# Patient Record
Sex: MALE | Race: WHITE | NOT HISPANIC OR LATINO | URBAN - METROPOLITAN AREA
[De-identification: names, ages, dates, MRNs, and addresses within clinical notes are randomized per-mention and may not be internally consistent; named-entity substitution may affect disease eponyms.]

---

## 2023-08-04 ENCOUNTER — OFFICE VISIT (OUTPATIENT)
Dept: NEUROSURGERY | Facility: CLINIC | Age: 58
End: 2023-08-04
Payer: COMMERCIAL

## 2023-08-04 VITALS
WEIGHT: 261.4 LBS | TEMPERATURE: 97.3 F | BODY MASS INDEX: 34.64 KG/M2 | DIASTOLIC BLOOD PRESSURE: 96 MMHG | SYSTOLIC BLOOD PRESSURE: 150 MMHG | HEIGHT: 73 IN

## 2023-08-04 DIAGNOSIS — M50.30 DEGENERATIVE DISC DISEASE, CERVICAL: ICD-10-CM

## 2023-08-04 DIAGNOSIS — M47.812 CERVICAL SPONDYLOSIS WITHOUT MYELOPATHY: ICD-10-CM

## 2023-08-04 DIAGNOSIS — M54.12 CERVICAL RADICULOPATHY: Primary | ICD-10-CM

## 2023-08-04 DIAGNOSIS — Z00.6 EXAMINATION FOR NORMAL COMPARISON OR CONTROL IN CLINICAL RESEARCH: ICD-10-CM

## 2023-08-04 RX ORDER — ATORVASTATIN CALCIUM 20 MG/1
1 TABLET, FILM COATED ORAL DAILY
COMMUNITY
Start: 2020-07-31

## 2023-08-04 RX ORDER — SILDENAFIL CITRATE 20 MG/1
TABLET ORAL
COMMUNITY
End: 2023-08-04

## 2023-08-04 RX ORDER — DICYCLOMINE HYDROCHLORIDE 10 MG/1
CAPSULE ORAL
COMMUNITY
End: 2023-08-04

## 2023-08-04 RX ORDER — LISINOPRIL 40 MG/1
1 TABLET ORAL DAILY
COMMUNITY
Start: 2000-05-31

## 2023-08-04 RX ORDER — FLUTICASONE PROPIONATE 50 MCG
2 SPRAY, SUSPENSION (ML) NASAL DAILY
COMMUNITY
End: 2023-08-04

## 2023-08-17 ENCOUNTER — TELEPHONE (OUTPATIENT)
Dept: NEUROSURGERY | Facility: CLINIC | Age: 58
End: 2023-08-17
Payer: COMMERCIAL

## 2023-08-17 DIAGNOSIS — M54.12 CERVICAL RADICULOPATHY: Primary | ICD-10-CM

## 2023-08-17 DIAGNOSIS — M50.30 DEGENERATIVE DISC DISEASE, CERVICAL: ICD-10-CM

## 2023-08-17 RX ORDER — DIAZEPAM 5 MG/1
5 TABLET ORAL AS NEEDED
Qty: 2 TABLET | Refills: 0 | Status: SHIPPED | OUTPATIENT
Start: 2023-08-17

## 2023-08-17 NOTE — TELEPHONE ENCOUNTER
Caller: Benedict Landaverde    Relationship to patient: Self    Best call back number: [phone]    Patient is needing: PATIENT CALLED, PATIENT IS WANTING TO KNOW IF HE CAN BE SEDATED TO COMPLETE AN MRI. PATIENT WAS UNABLE TO COMPLETE HIS MRI AT [facility] DUE TO CLAUSTROPHOBIA. PLEASE CALL PATIENT TO ADVISE.    THANK YOU

## 2023-08-17 NOTE — TELEPHONE ENCOUNTER
Provider:  Porsche  Surgery/Procedure:  MICHAEL  Surgery/Procedure Date:    Last visit:   8/4/23  Next visit: 8/25/23     Reason for call:  Mr. Landaverde reports being unable to complete his MRI at [facility]. He preferred that location as he thought they had an open MRI machine, but on arrival was told they only had the normal MRI available. Due to his claustrophobia he was unable to complete the imaging. He is requesting something to help him complete the imaging, and to reschedule the MRI.     Romario: nothing to report on Romario.

## 2023-08-17 NOTE — TELEPHONE ENCOUNTER
Can you please pend this over to Katie, Burak, or Jason so either of them can sign this for me? I am still unable to sign this drug class.

## 2023-08-17 NOTE — TELEPHONE ENCOUNTER
I have already talked with the patient, and he will r/s his MRI and if needed reach back out to the office to r/s follow up.

## 2023-09-12 ENCOUNTER — HOSPITAL ENCOUNTER (OUTPATIENT)
Dept: GENERAL RADIOLOGY | Facility: HOSPITAL | Age: 58
Discharge: HOME | End: 2023-09-12
Payer: COMMERCIAL

## 2023-09-12 ENCOUNTER — APPOINTMENT (OUTPATIENT)
Dept: OTHER | Facility: HOSPITAL | Age: 58
End: 2023-09-12
Payer: COMMERCIAL

## 2023-09-12 ENCOUNTER — OFFICE VISIT (OUTPATIENT)
Dept: NEUROSURGERY | Facility: CLINIC | Age: 58
End: 2023-09-12
Payer: COMMERCIAL

## 2023-09-12 VITALS — BODY MASS INDEX: 34.59 KG/M2 | HEIGHT: 73 IN | WEIGHT: 261 LBS

## 2023-09-12 DIAGNOSIS — M54.12 CERVICAL RADICULOPATHY: ICD-10-CM

## 2023-09-12 DIAGNOSIS — M47.812 CERVICAL SPONDYLOSIS WITHOUT MYELOPATHY: ICD-10-CM

## 2023-09-12 DIAGNOSIS — M50.30 DEGENERATIVE DISC DISEASE, CERVICAL: ICD-10-CM

## 2023-09-12 DIAGNOSIS — M54.12 CERVICAL RADICULOPATHY: Primary | ICD-10-CM

## 2023-09-12 PROCEDURE — 72050 X-RAY EXAM NECK SPINE 4/5VWS: CPT

## 2023-09-12 PROCEDURE — 99214 OFFICE O/P EST MOD 30 MIN: CPT

## 2023-09-12 NOTE — PROGRESS NOTES
Office Note     Name: Benedict Landaverde    : 1965     MRN: [id number]     PCP: Chuck Leyva MD    Chief Complaint  Neck and left upper extremity pain with sensory alteration    Subjective     History of Present Illness:  Mr. 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  He has previously underwent ACDF C6-7 by Dr. Montalvo in .  More recently, he has developed progressively worsening neck and left upper extremity pain with sensory alteration.  He has dealt with neck pain for the past few years.  Much of his pain resides along the neck and into his shoulder blade region.  Roughly a month or so ago he developed numbness of the left upper extremity extending from the neck down to the elbow radiating into the thumb and index finger.  His symptoms are typically worse in the afternoons.  Driving and working on a computer exacerbates his symptoms.  There is no trauma or inciting event.  He denies right upper extremity symptoms.  He utilizes a TENS unit and stretching exercises at home to give him relief.  He also reports a loss in strength in the left hand.  He denies bladder dysfunctions, weakness, or gait dysfunction.  No other complaints this time.    Review of Systems:   Review of Systems    The patient's past medical history, past surgical history, family history, and social history have been reviewed at length in the electronic medical record.       Past Medical History:   Past Medical History:   Diagnosis Date    Arthritis     Unduagnosed but having snehal joint pain in hands    Cervical disc disorder 2005    Hyperlipidemia 2020    Hypertension 2000    Low back pain 1980    Lumbosacral disc disease        Past Surgical History:   Past Surgical History:   Procedure Laterality Date    NECK SURGERY  2006       Family History:   Family History   Problem Relation Age of Onset    Hyperlipidemia Mother      "Arthritis Father     Cancer Father        Social History:   Social History     Socioeconomic History    Marital status:    Tobacco Use    Smoking status: Never    Tobacco comments:     I use smokless tobacco refularly   Vaping Use    Vaping Use: Never used   Substance and Sexual Activity    Alcohol use: Yes     Alcohol/week: 10.0 standard drinks     Types: 10 Shots of liquor per week    Drug use: Never    Sexual activity: Yes     Partners: Female     Birth control/protection: Surgical       Immunizations:   Immunization History   Administered Date(s) Administered    COVID-19 (PFIZER) Purple Cap Monovalent 03/19/2021, 04/10/2021, 11/22/2021        Medications:     Current Outpatient Medications:     atorvastatin (LIPITOR) 20 MG tablet, Take 1 tablet by mouth Daily., Disp: , Rfl:     lisinopril (PRINIVIL,ZESTRIL) 40 MG tablet, Take 1 tablet by mouth Daily., Disp: , Rfl:     diazePAM (VALIUM) 5 MG tablet, Take 1 tablet by mouth As Needed for Anxiety. Take 1 tablet 30 minutes prior to your MRI, take 1 additional tablet if needed., Disp: 2 tablet, Rfl: 0    Allergies:   No Known Allergies    Objective     Vital Signs  Ht 185.4 cm (73\")   Wt 118 kg (261 lb)   BMI 34.43 kg/m²   Estimated body mass index is 34.43 kg/m² as calculated from the following:    Height as of this encounter: 185.4 cm (73\").    Weight as of this encounter: 118 kg (261 lb).    Physical Exam   Constitutional: Patient is well-developed and appears stated age. Pleasant and   cooperative. Appears in no acute distress.   HENT:   Head normocephalic and atraumatic.  Eyes: Pupils are equal, round, and reactive to light.   Musculoskeletal:     Strength is intact in upper and lower extremities to direct testing.     Station and gait are normal.     Straight leg raising is negative.   Neurologic:     Muscle tone is normal throughout.     Coordination is intact.     No clonus is elicited.      Deep tendon reflexes: 1+ and symmetrical.     Sensation is " intact to light touch throughout.     Patient is oriented to person, place, and time.     Regan sign negative bilaterally  Psychiatric: Normal behavior and thought content.  Skin: Clean, dry, and intact.     Tobacco Use: Unknown    Smoking Tobacco Use: Never    Smokeless Tobacco Use: Unknown    Passive Exposure: Not on file        Body mass index is 34.43 kg/m².    Fall Risk Assessment was completed, and patient is at low risk for falls.    Assessment and Plan     Medical Decision Making     Data Review:   (All imaging independently reviewed unless stated otherwise.)   CT of the cervical spine without contrast dated 11/15/2022 demonstrates degenerative changes throughout the cervical spine with multiple disc osteophyte complexes.  There is facet hypertrophy throughout but most severe at C 5-6.  Moderate to severe left and right neuroforaminal stenosis at multiple levels.  Previous fusion at C6-7 is noted with no hardware complications.     MRI of the cervical spine with and without contrast dated 8/31/2023 demonstrates degenerative changes and postsurgical changes from previous fusion at C6/7.  There are multiple disc osteophyte complexes most severe at C3-4 and C5-6 with moderate to severe left and moderate right foraminal narrowing.    Plain films of the cervical spine complete with flexion/extension dated 9/12/2023 demonstrate    Diagnosis:  1.  Cervical radiculopathy  2.  Status post ACDF C6-7  3.  Degenerative disc disease, cervical    Treatment Options:   Mr. Landaverde presents today to discuss recent imaging.  Symptoms remain unchanged from his previous visit.  Long tract signs remain absent on exam.  At this time, I have encouraged him to attend formal physical therapy.  We discussed multiple options moving forward.  Surgical intervention may be a possibility.  He will follow-up in 4 to 6 weeks to check on his progress.  If he develops new or worsening symptoms I have encouraged him to call our office he will  be scheduled immediately for follow-up.       Diagnosis Plan   1. Cervical radiculopathy        2. Degenerative disc disease, cervical        3. Cervical spondylosis without myelopathy                  Madi Morrell PA-C  [facility]  [address] [address] [phone]

## 2023-10-10 ENCOUNTER — OFFICE VISIT (OUTPATIENT)
Dept: NEUROSURGERY | Facility: CLINIC | Age: 58
End: 2023-10-10
Payer: COMMERCIAL

## 2023-10-10 VITALS
SYSTOLIC BLOOD PRESSURE: 158 MMHG | DIASTOLIC BLOOD PRESSURE: 100 MMHG | BODY MASS INDEX: 34.85 KG/M2 | WEIGHT: 263 LBS | HEIGHT: 73 IN

## 2023-10-10 DIAGNOSIS — M50.30 DEGENERATIVE DISC DISEASE, CERVICAL: ICD-10-CM

## 2023-10-10 DIAGNOSIS — M47.812 CERVICAL SPONDYLOSIS WITHOUT MYELOPATHY: ICD-10-CM

## 2023-10-10 DIAGNOSIS — M54.12 CERVICAL RADICULOPATHY: Primary | ICD-10-CM

## 2023-10-10 NOTE — PROGRESS NOTES
Office Note     Name: Benedcit Landaverde    : 1965     MRN: [id number]     PCP: Chuck Leyva MD    Chief Complaint  Neck and left upper extremity pain with sensory alteration.    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  He has previously underwent ACDF C6-7 by Dr. Montalvo in .  More recently, he has developed progressively worsening neck and left upper extremity pain with sensory alteration.  He has dealt with neck pain for the past few years.  Much of his pain resides along the neck and into his shoulder blade region.  A few months ago  he developed numbness of the left upper extremity extending from the neck down to the elbow radiating into the thumb and index finger.  He reports that his first 3 digits of the left hand are completely numb.  His symptoms are typically worse in the afternoons.  Driving and working on a computer exacerbates his symptoms.  There was no trauma or inciting event.  More recently he has been developing some tolerable right upper extremity symptoms as well.  He utilizes a TENS unit and stretching exercises at home to give him relief.  He also reports a loss in strength in the left hand.  He does report that his symptoms are tolerable at times.  He denies bladder dysfunctions, weakness, or gait dysfunction.  No other complaints this time.    Review of Systems:   Review of Systems   Musculoskeletal:  Positive for back pain, neck pain and neck stiffness.   Neurological:  Positive for numbness (left arm).     The patient's past medical history, past surgical history, family history, and social history have been reviewed at length in the electronic medical record.       Past Medical History:   Past Medical History:   Diagnosis Date    Abnormal ECG     Arthritis     Unduagnosed but having snehal joint pain in hands    Cervical disc  "disorder 2005    Claustrophobia     Hyperlipidemia 2020    Hypertension 2000    Low back pain 1980    Lumbosacral disc disease        Past Surgical History:   Past Surgical History:   Procedure Laterality Date    NECK SURGERY  2006    SPINAL FUSION  2007       Family History:   Family History   Problem Relation Age of Onset    Hyperlipidemia Mother     Arthritis Father     Cancer Father     COPD Father        Social History:   Social History     Socioeconomic History    Marital status:    Tobacco Use    Smoking status: Never    Smokeless tobacco: Current     Types: Snuff    Tobacco comments:     I use smokless tobacco refularly   Vaping Use    Vaping Use: Never used   Substance and Sexual Activity    Alcohol use: Yes     Alcohol/week: 10.0 standard drinks of alcohol     Types: 10 Shots of liquor per week    Drug use: Never    Sexual activity: Yes     Partners: Female     Birth control/protection: Surgical       Immunizations:   Immunization History   Administered Date(s) Administered    COVID-19 (PFIZER) Purple Cap Monovalent 03/19/2021, 04/10/2021, 11/22/2021        Medications:     Current Outpatient Medications:     atorvastatin (LIPITOR) 20 MG tablet, Take 1 tablet by mouth Daily., Disp: , Rfl:     lisinopril (PRINIVIL,ZESTRIL) 40 MG tablet, Take 1 tablet by mouth Daily., Disp: , Rfl:     Allergies:   No Known Allergies    Objective     Vital Signs  /100 (BP Location: Right arm, Patient Position: Sitting, Cuff Size: Adult)   Ht 185.4 cm (73\")   Wt 119 kg (263 lb)   BMI 34.70 kg/mý   Estimated body mass index is 34.7 kg/mý as calculated from the following:    Height as of this encounter: 185.4 cm (73\").    Weight as of this encounter: 119 kg (263 lb).    Physical Exam   Constitutional: Patient is well-developed and appears stated age. Pleasant and   cooperative. Appears in no acute distress.   HENT:   Head normocephalic and atraumatic.  Eyes: Pupils are equal, round, and reactive to light. "   Musculoskeletal:     Strength is intact in upper and lower extremities to direct testing.     Station and gait are normal.     Range of motion of the neck is slightly limited.     Tenderness to palpation is not observed in the cervical spine.  Neurologic:     Muscle tone is normal throughout.     Coordination is intact.     No clonus is elicited.      Deep tendon reflexes: 1+ and symmetrical.     Sensation is a bit altered to light touch testing along the medial forearm and into the first 3 digits of the left hand.     Patient is oriented to person, place, and time.     Regan sign negative bilaterally  Psychiatric: Normal behavior and thought content.  Skin: Clean, dry, and intact.     Tobacco Use: High Risk (10/10/2023)    Patient History     Smoking Tobacco Use: Never     Smokeless Tobacco Use: Current     Passive Exposure: Not on file        Body mass index is 34.7 kg/mý.    Fall Risk Assessment was completed, and patient is at low risk for falls.    Assessment and Plan     Medical Decision Making     Data Review:   (All imaging independently reviewed unless stated otherwise.)   CT of the cervical spine without contrast dated 11/15/2022 demonstrates degenerative changes throughout the cervical spine with multiple disc osteophyte complexes.  There is facet hypertrophy throughout but most severe at C 5-6.  Moderate to severe left and right neuroforaminal stenosis at multiple levels.  Previous fusion at C6-7 is noted with no hardware complications.      MRI of the cervical spine with and without contrast dated 8/31/2023 demonstrates degenerative changes and postsurgical changes from previous fusion at C6/7.  There are multiple disc osteophyte complexes most severe at C3-4 and C5-6 with moderate to severe left and moderate right foraminal narrowing.     Plain films of the cervical spine complete with flexion/extension dated 9/12/2023 demonstrate    Plain films of the cervical spine complete with flexion and  extension demonstrate postsurgical and degenerative changes.  There is slight spurring at C3-4 with a slight retrolisthesis.  Mild spurring at C4-5 and C5-6.  Normal alignment at the operative site.    Diagnosis:  1.  Cervical radiculopathy  2.  Status post ACDF C6-7  3.  Degenerative disc disease, cervical    Treatment Options:   Mr. Landaverde presents today for follow-up.  He has utilized a few sessions of physical therapy but has not seen much relief.  He does have plans to continue this.  He is not particularly keen about starting gabapentin.  He has also utilized epidural steroid injections in the past that provided no relief.  At this time, he is dealing with some current family issues that hinders him from moving forward with other options.  In the future, surgical intervention may be a possibility.  He has requested to follow-up in 6 weeks to give us an update on his progress.  If he develops new or worsening symptoms I encouraged him to call our office immediately.       Diagnosis Plan   1. Cervical radiculopathy        2. Degenerative disc disease, cervical        3. Cervical spondylosis without myelopathy                Madi Morrell PA-C  [facility]  [address] [address] [phone]

## 2023-11-22 ENCOUNTER — OFFICE VISIT (OUTPATIENT)
Dept: NEUROSURGERY | Facility: CLINIC | Age: 58
End: 2023-11-22
Payer: COMMERCIAL

## 2023-11-22 VITALS — HEIGHT: 73 IN | TEMPERATURE: 97.5 F | BODY MASS INDEX: 34.72 KG/M2 | WEIGHT: 262 LBS

## 2023-11-22 DIAGNOSIS — Z98.1 HISTORY OF FUSION OF CERVICAL SPINE: ICD-10-CM

## 2023-11-22 DIAGNOSIS — M54.12 CERVICAL RADICULOPATHY: Primary | ICD-10-CM

## 2023-11-22 DIAGNOSIS — M50.30 DEGENERATIVE DISC DISEASE, CERVICAL: ICD-10-CM

## 2023-11-22 PROCEDURE — 99213 OFFICE O/P EST LOW 20 MIN: CPT | Performed by: NEUROLOGICAL SURGERY

## 2023-11-22 NOTE — PROGRESS NOTES
Patient: Benedict Landaverde  : 1965    Primary Care Provider: Chuck Leyva MD    Requesting Provider: As above        History    Chief Complaint: Neck and left upper extremity pain with sensory alteration.    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  In  underwent ACDF at C6-7 by my former colleague Dr. Montalvo.  He has had some low-grade symptoms since that time.  More recently developed neck and left arm pain with sensory alteration involving the left thumb.  The arm pain has gone away.  He still has some numbness in the left thumb.  At times he will get numbness in all of the digits of the left hand except the fifth digit.  He reports no focal weakness at this time.  His symptoms are annoying but not debilitating.      Review of Systems   Constitutional:  Negative for activity change, appetite change, chills, diaphoresis, fatigue, fever and unexpected weight change.   HENT:  Negative for congestion, dental problem, drooling, ear discharge, ear pain, facial swelling, hearing loss, mouth sores, nosebleeds, postnasal drip, rhinorrhea, sinus pressure, sinus pain, sneezing, sore throat, tinnitus, trouble swallowing and voice change.    Eyes:  Negative for photophobia, pain, discharge, redness, itching and visual disturbance.   Respiratory:  Negative for apnea, cough, choking, chest tightness, shortness of breath, wheezing and stridor.    Cardiovascular:  Negative for chest pain, palpitations and leg swelling.   Gastrointestinal:  Negative for abdominal distention, abdominal pain, anal bleeding, blood in stool, constipation, diarrhea, nausea, rectal pain and vomiting.   Endocrine: Negative for cold intolerance, heat intolerance, polydipsia, polyphagia and polyuria.   Genitourinary:  Negative for decreased urine volume, difficulty urinating, dysuria, enuresis, flank pain, frequency, genital sores, hematuria, penile discharge, penile pain, penile swelling, scrotal  "swelling, testicular pain and urgency.   Musculoskeletal:  Positive for neck stiffness. Negative for arthralgias, back pain, gait problem, joint swelling, myalgias and neck pain.   Skin:  Negative for color change, pallor, rash and wound.   Allergic/Immunologic: Negative for environmental allergies, food allergies and immunocompromised state.   Neurological:  Positive for numbness. Negative for dizziness, tremors, seizures, syncope, facial asymmetry, speech difficulty, weakness, light-headedness and headaches.   Hematological:  Negative for adenopathy. Does not bruise/bleed easily.   Psychiatric/Behavioral:  Negative for agitation, behavioral problems, confusion, decreased concentration, dysphoric mood, hallucinations, self-injury, sleep disturbance and suicidal ideas. The patient is not nervous/anxious and is not hyperactive.        The patient's past medical history, past surgical history, family history, and social history have been reviewed at length in the electronic medical record.      Physical Exam:   Temp 97.5 °F (36.4 °C) (Infrared)   Ht 185.4 cm (73\")   Wt 119 kg (262 lb)   BMI 34.57 kg/m²   MUSCULOSKELETAL:  Neck tenderness to palpation is not observed.   ROM in the neck is normal.  Tinel's sign is negative at both wrist.  NEUROLOGICAL:  Strength is intact in the upper and lower extremities to direct testing.  Muscle tone is normal throughout.  Station and gait are normal.  Sensation is intact to light touch testing throughout except in the left thumb where it is diminished.  Deep tendon reflexes are 1+ and symmetrical.  Regan's Sign is negative bilaterally.       Medical Decision Making    Data Review:   (All imaging studies were personally reviewed unless stated otherwise)  MRI of the cervical spine dated 8/31/2023 demonstrates central disc-osteophyte at C3-4 of the causes mild contouring of the spinal cord.  Solid fusion is noted at C6-7.  Broad-based spurring narrows the recesses bilaterally at " C5-6.  There is some foraminal narrowing on the left at C7-T1.    Diagnosis:   1.  Cervical spondylosis with left upper extremity radiculopathy, improved.  2.  Possible peripheral nerve entrapment.    Treatment Options:   The patient symptoms are modest at this point.  I do not see anything worrisome on his studies.  If his symptoms progress and become less tolerable then he will follow-up with me.  His thumb numbness may or may not improve over time.      Scribed for Daniele Carlson MD by Daniele Carlson MD on 11/22/2023 14:47 EST      I, Dr. Carlson, personally performed the services described in the documentation, as scribed in my presence, and it is both accurate and complete.

## 2024-01-04 ENCOUNTER — OFFICE VISIT (OUTPATIENT)
Dept: CARDIOLOGY | Facility: CLINIC | Age: 59
End: 2024-01-04
Payer: COMMERCIAL

## 2024-01-04 VITALS
HEIGHT: 73 IN | OXYGEN SATURATION: 96 % | SYSTOLIC BLOOD PRESSURE: 140 MMHG | HEART RATE: 78 BPM | WEIGHT: 263 LBS | BODY MASS INDEX: 34.85 KG/M2 | DIASTOLIC BLOOD PRESSURE: 84 MMHG

## 2024-01-04 DIAGNOSIS — G47.33 OSA (OBSTRUCTIVE SLEEP APNEA): Primary | ICD-10-CM

## 2024-01-04 DIAGNOSIS — I10 PRIMARY HYPERTENSION: ICD-10-CM

## 2024-01-04 DIAGNOSIS — E78.00 PURE HYPERCHOLESTEROLEMIA: ICD-10-CM

## 2024-01-04 DIAGNOSIS — Z12.5 SCREENING FOR PROSTATE CANCER: ICD-10-CM

## 2024-01-04 RX ORDER — AMLODIPINE BESYLATE 5 MG/1
5 TABLET ORAL DAILY
Qty: 90 TABLET | Refills: 0 | Status: SHIPPED | OUTPATIENT
Start: 2024-01-04

## 2024-01-04 NOTE — ASSESSMENT & PLAN NOTE
Baseline AHI 27.  This is moderate sleep apnea.    He is on CPAP therapy.  Download reviewed with good control and good compliance.    He is benefiting from PAP therapy with plan to continue PAP therapy.    Noted that his original PAP device is nearing 5 years old.  At follow-up visit consider and discuss new CPAP.

## 2024-01-04 NOTE — ASSESSMENT & PLAN NOTE
He is on atorvastatin 20 mg.    No recent fasting lipids.    Plan:  Continue atorvastatin 20mg   Check fasting lipid profile

## 2024-01-04 NOTE — ASSESSMENT & PLAN NOTE
Patient's blood pressure 140/84.    Noted to be elevated at office visit 10/10/2023.    Patient reports blood pressure has been elevated on and off at home and at doctor's office visits.    Noted suboptimal control of blood pressure.    He is on lisinopril 40 mg daily.    We will add amlodipine 5 mg daily.    Short follow-up in 1 month to recheck blood pressure.

## 2024-01-04 NOTE — PROGRESS NOTES
[facility] Provider Note     Date:   2024  Name: Benedict Landaverde  :   1965  PCP: Chuck Leyva MD    Chief Complaint   Patient presents with    Sleep Apnea       Subjective     History of Present Illness  Benedict Landaverde is a 58 y.o. male who presents today for annual follow-up for his known history of moderate sleep apnea on CPAP device, hypertension and hyperlipidemia.    He reports that he has seen his PCP back in the fall but he has not completed any new labs.  He reports he is having problems with neck pain and has seen neurologist.  Told C3 and C4 spinal stenosis but he reports no plans for surgery.    He reports that his blood pressure has been elevated at recent doctor's office visit and occasionally at home when he checks it.  He wonders if we could adjust his blood pressure medications.    Sleep and cardiac history:    ELIEL baseline AHI 27 on 3/23/2018    Current ELIEL therapy auto CPAP 4 to 20 cm    Hypertension    Hyperlipidemia     Reports Denies   Chest Pain [] [x]   Shortness of Air [] [x]   Palpitations [] [x]   Edema [] [x]   Dizziness [] [x]   Syncope [] [x]         Current mask used is nasal pillows    Device Functioning Well: Yes -on replacement by Pretty  Mask Fit Comfortable: Yes  Air Flow Comfortable: Yes  DME Helpful for Supplies: Yes  Sleep is rested:   Yes        Device Download:               No Known Allergies    Current Outpatient Medications:     atorvastatin (LIPITOR) 20 MG tablet, Take 1 tablet by mouth Daily., Disp: , Rfl:     lisinopril (PRINIVIL,ZESTRIL) 40 MG tablet, Take 1 tablet by mouth Daily., Disp: , Rfl:     amLODIPine (NORVASC) 5 MG tablet, Take 1 tablet by mouth Daily., Disp: 90 tablet, Rfl: 0    Past Medical History:   Diagnosis Date    Abnormal ECG     Arthritis     Unduagnosed but having snehal joint pain in hands    Cervical disc disorder     Claustrophobia     Hyperlipidemia 2020    Hypertension 2000    Low back pain 1980  "   Lumbosacral disc disease       Past Surgical History:   Procedure Laterality Date    NECK SURGERY  2006    SPINAL FUSION  2007     Family History   Problem Relation Age of Onset    Hyperlipidemia Mother     Arthritis Father     Cancer Father     COPD Father      Social History     Socioeconomic History    Marital status:    Tobacco Use    Smoking status: Never    Smokeless tobacco: Current     Types: Snuff    Tobacco comments:     I use smokless tobacco refularly   Vaping Use    Vaping Use: Never used   Substance and Sexual Activity    Alcohol use: Yes     Alcohol/week: 10.0 standard drinks of alcohol     Types: 10 Shots of liquor per week    Drug use: Never    Sexual activity: Yes     Partners: Female     Birth control/protection: Surgical       Objective     Vital Signs:  Pulse 78   Ht 185.4 cm (73\")   Wt 119 kg (263 lb)   SpO2 96%   BMI 34.70 kg/m²   Estimated body mass index is 34.7 kg/m² as calculated from the following:    Height as of this encounter: 185.4 cm (73\").    Weight as of this encounter: 119 kg (263 lb).               Physical Exam  Constitutional:       Appearance: Normal appearance. He is well-developed.   HENT:      Head: Normocephalic and atraumatic.      Nose: Nose normal.      Mouth/Throat:      Mouth: Mucous membranes are moist.   Eyes:      General: No scleral icterus.     Pupils: Pupils are equal, round, and reactive to light.   Neck:      Vascular: No carotid bruit.   Cardiovascular:      Rate and Rhythm: Normal rate and regular rhythm.      Pulses: Normal pulses.           Radial pulses are 2+ on the right side and 2+ on the left side.        Dorsalis pedis pulses are 2+ on the right side and 2+ on the left side.        Posterior tibial pulses are 2+ on the right side and 2+ on the left side.      Heart sounds: Normal heart sounds. No murmur heard.  Pulmonary:      Effort: Pulmonary effort is normal.      Breath sounds: Normal breath sounds. No wheezing or rhonchi. "   Abdominal:      General: Bowel sounds are normal.   Musculoskeletal:      Right lower leg: No edema.      Left lower leg: No edema.   Skin:     General: Skin is warm and dry.      Capillary Refill: Capillary refill takes less than 2 seconds.      Coloration: Skin is not cyanotic.      Nails: There is no clubbing.   Neurological:      Mental Status: He is alert and oriented to person, place, and time.      Motor: No weakness.      Gait: Gait normal.   Psychiatric:         Mood and Affect: Mood normal.         Behavior: Behavior normal. Behavior is cooperative.         Thought Content: Thought content normal.         Cognition and Memory: Memory normal.         The following data was reviewed by: OLIVIER Black on 01/04/2024:    PAP download reviewed: 11/18/2023 through 12/17/2023.  30-day download.  I have reviewed and interpreted the data on the download.      ECG 12 Lead    Date/Time: 1/4/2024 5:06 PM  Performed by: Ebonie Herring APRN    Authorized by: Ebonie Herring APRN  Comparison: compared with previous ECG from 7/21/2021  Similar to previous ECG  Comparison to previous ECG: Sinus rhythm  Rhythm: sinus rhythm  Rate: normal  BPM: 69  Conduction: conduction normal  ST Segments: ST segments normal  T Waves: T waves normal  Other: no other findings    Clinical impression: normal ECG           Assessment and Plan     Diagnoses and all orders for this visit:    1. ELIEL (obstructive sleep apnea) (Primary)  Assessment & Plan:  Baseline AHI 27.  This is moderate sleep apnea.    He is on CPAP therapy.  Download reviewed with good control and good compliance.    He is benefiting from PAP therapy with plan to continue PAP therapy.    Noted that his original PAP device is nearing 5 years old.  At follow-up visit consider and discuss new CPAP.        Orders:  -     PAP Therapy    2. Primary hypertension  Assessment & Plan:  Patient's blood pressure 140/84.    Noted to be elevated at office visit  10/10/2023.    Patient reports blood pressure has been elevated on and off at home and at doctor's office visits.    Noted suboptimal control of blood pressure.    He is on lisinopril 40 mg daily.    We will add amlodipine 5 mg daily.    Short follow-up in 1 month to recheck blood pressure.    Orders:  -     CBC & Differential; Future  -     Comprehensive Metabolic Panel; Future  -     amLODIPine (NORVASC) 5 MG tablet; Take 1 tablet by mouth Daily.  Dispense: 90 tablet; Refill: 0  -     ECG 12 Lead    3. Pure hypercholesterolemia  Assessment & Plan:  He is on atorvastatin 20 mg.    No recent fasting lipids.    Plan:  Continue atorvastatin 20mg   Check fasting lipid profile    Orders:  -     Lipid Panel; Future    4. Screening for prostate cancer  Assessment & Plan:  He has not completed his annual labs.    Order for screening PSA given.    Orders:  -     PSA Screen; Future        Recommendations: Report if any new/changing symptoms immediately, Limit salt, Stop cigarettes, and Increase pap therapy usage          Follow Up  Return in about 1 month (around 2/4/2024) for Recheck BP.  Patient was given instructions and counseling regarding his condition or for health maintenance advice. Please see specific information pulled into the AVS if appropriate.

## 2024-01-11 RX ORDER — LISINOPRIL 40 MG/1
40 TABLET ORAL DAILY
Qty: 90 TABLET | Refills: 3 | Status: SHIPPED | OUTPATIENT
Start: 2024-01-11

## 2024-01-11 RX ORDER — ATORVASTATIN CALCIUM 20 MG/1
20 TABLET, FILM COATED ORAL DAILY
Qty: 90 TABLET | Refills: 3 | Status: SHIPPED | OUTPATIENT
Start: 2024-01-11

## 2024-02-16 DIAGNOSIS — E78.00 PURE HYPERCHOLESTEROLEMIA: ICD-10-CM

## 2024-02-16 DIAGNOSIS — I10 PRIMARY HYPERTENSION: ICD-10-CM

## 2024-02-16 DIAGNOSIS — Z12.5 SCREENING FOR PROSTATE CANCER: ICD-10-CM

## 2024-02-22 ENCOUNTER — OFFICE VISIT (OUTPATIENT)
Dept: CARDIOLOGY | Facility: CLINIC | Age: 59
End: 2024-02-22
Payer: COMMERCIAL

## 2024-02-22 VITALS
HEART RATE: 82 BPM | SYSTOLIC BLOOD PRESSURE: 138 MMHG | RESPIRATION RATE: 16 BRPM | WEIGHT: 267.6 LBS | OXYGEN SATURATION: 97 % | DIASTOLIC BLOOD PRESSURE: 76 MMHG | BODY MASS INDEX: 35.47 KG/M2 | HEIGHT: 73 IN

## 2024-02-22 DIAGNOSIS — I10 PRIMARY HYPERTENSION: ICD-10-CM

## 2024-02-22 DIAGNOSIS — E78.00 PURE HYPERCHOLESTEROLEMIA: Primary | ICD-10-CM

## 2024-02-22 PROCEDURE — 99214 OFFICE O/P EST MOD 30 MIN: CPT | Performed by: NURSE PRACTITIONER

## 2024-02-22 RX ORDER — AMLODIPINE BESYLATE 5 MG/1
5 TABLET ORAL DAILY
Qty: 90 TABLET | Refills: 1 | Status: SHIPPED | OUTPATIENT
Start: 2024-02-22

## 2024-02-22 NOTE — PROGRESS NOTES
[facility] Provider Note     Date:   2024   Name: Benedict Landaverde  :   1965  PCP: Chuck Leyva MD    Chief Complaint   Patient presents with    Follow-up     1 month follow for HTN and ELIEL. Did ayla bring in CHIP for download. Last one per [facility] 2023.  Hands only CPR video reviewed.       Subjective     History of Present Illness  Benedict Landaverde is a 58 y.o. male who presents today for short 1 month follow-up on hypertension.    At his last visit it was noticed he was having a headache and pressure behind his eyes with elevated blood pressure readings.  Amlodipine 5 mg daily was added to his medication regimen.  He was asked to get labs and return in 1 month.    Noted today that he reports his symptoms have resolved.  No headache no pressure behind his eyes.  He reports he does not check his blood pressure often but he knows he is feeling much better.    He has completed labs and those are reviewed at today's visit.    Sleep and cardiac history:    ELIEL baseline AHI 27 on 3/23/2018    Current ELIEL therapy auto CPAP 4 to 20 cm    Hypertension    Hyperlipidemia     Reports Denies   Chest Pain [] [x]   Shortness of Air [] [x]   Palpitations [] [x]   Edema [] [x]   Dizziness [] [x]   Syncope [] [x]       No Known Allergies    Current Outpatient Medications:     amLODIPine (NORVASC) 5 MG tablet, Take 1 tablet by mouth Daily., Disp: 90 tablet, Rfl: 1    atorvastatin (LIPITOR) 20 MG tablet, TAKE 1 TABLET BY MOUTH EVERY DAY, Disp: 90 tablet, Rfl: 3    lisinopril (PRINIVIL,ZESTRIL) 40 MG tablet, TAKE 1 TABLET BY MOUTH EVERY DAY, Disp: 90 tablet, Rfl: 3    Past Medical History:   Diagnosis Date    Abnormal ECG     Arthritis     Unduagnosed but having snehal joint pain in hands    Cervical disc disorder     Claustrophobia     Hyperlipidemia 2020    Hypertension 2000    Low back pain 1980    Lumbosacral disc disease       Past Surgical History:   Procedure Laterality Date  "   NECK SURGERY  2006    SPINAL FUSION  2007     Family History   Problem Relation Age of Onset    Hyperlipidemia Mother     Arthritis Father     Cancer Father     COPD Father      Social History     Socioeconomic History    Marital status:    Tobacco Use    Smoking status: Never     Passive exposure: Past    Smokeless tobacco: Current     Types: Snuff    Tobacco comments:     I use smokless tobacco refularly   Vaping Use    Vaping Use: Never used   Substance and Sexual Activity    Alcohol use: Yes     Alcohol/week: 10.0 standard drinks of alcohol     Types: 10 Shots of liquor per week    Drug use: Never    Sexual activity: Yes     Partners: Female     Birth control/protection: Surgical       Objective     Vital Signs:  /76 (BP Location: Left arm, Patient Position: Sitting, Cuff Size: Adult)   Pulse 82   Resp 16   Ht 185.4 cm (73\")   Wt 121 kg (267 lb 9.6 oz)   SpO2 97%   BMI 35.31 kg/m²   Estimated body mass index is 35.31 kg/m² as calculated from the following:    Height as of this encounter: 185.4 cm (73\").    Weight as of this encounter: 121 kg (267 lb 9.6 oz).               Physical Exam  HENT:      Head: Normocephalic.      Nose: Nose normal.      Mouth/Throat:      Mouth: Mucous membranes are moist.   Pulmonary:      Effort: Pulmonary effort is normal.   Skin:     General: Skin is warm and dry.   Neurological:      Mental Status: He is alert and oriented to person, place, and time.   Psychiatric:         Mood and Affect: Mood normal.         Behavior: Behavior normal.         Thought Content: Thought content normal.           Labs reviewed: PSA, CBC CMP and lipid profile 2/16/2024          Assessment and Plan     Diagnoses and all orders for this visit:    1. Pure hypercholesterolemia (Primary)  Assessment & Plan:  He is on atorvastatin 20 mg nightly.    Fasting lipid profile completed.    LDL noted to be 111  Total cholesterol less than 200  HDL greater than 70  Triglycerides within normal " limits    Plan: She is accused she is    continue atorvastatin 20 mg    Low-cholesterol diet    Increase fiber in diet    Follow-up in 6 months.      2. Primary hypertension  Assessment & Plan:  At his last visit amlodipine 5 mg was added to his lisinopril 40 mg.      He reports he is feeling better.  He denies any headaches or pressure behind his eyes.    Blood pressure in office today 138/76.  This is improved.    Plan:    Continue amlodipine 5 mg daily and lisinopril 40 mg daily for blood pressure control.    Low-sodium diet    Weight loss advised.    Increase exercise.    Follow-up in 6 months.    Orders:  -     amLODIPine (NORVASC) 5 MG tablet; Take 1 tablet by mouth Daily.  Dispense: 90 tablet; Refill: 1        Recommendations: Report if any new/changing symptoms immediately, Limit salt, Exercise recommendations discussed, and discussed low-cholesterol diet.          Follow Up  Return in about 6 months (around 8/22/2024) for BP/ELIEL.  Patient was given instructions and counseling regarding his condition or for health maintenance advice. Please see specific information pulled into the AVS if appropriate.

## 2024-02-22 NOTE — ASSESSMENT & PLAN NOTE
At his last visit amlodipine 5 mg was added to his lisinopril 40 mg.      He reports he is feeling better.  He denies any headaches or pressure behind his eyes.    Blood pressure in office today 138/76.  This is improved.    Plan:    Continue amlodipine 5 mg daily and lisinopril 40 mg daily for blood pressure control.    Low-sodium diet    Weight loss advised.    Increase exercise.    Follow-up in 6 months.

## 2024-02-22 NOTE — ASSESSMENT & PLAN NOTE
He is on atorvastatin 20 mg nightly.    Fasting lipid profile completed.    LDL noted to be 111  Total cholesterol less than 200  HDL greater than 70  Triglycerides within normal limits    Plan: She is accused she is    continue atorvastatin 20 mg    Low-cholesterol diet    Increase fiber in diet    Follow-up in 6 months.

## 2024-07-09 DIAGNOSIS — I10 PRIMARY HYPERTENSION: ICD-10-CM

## 2024-07-10 RX ORDER — AMLODIPINE BESYLATE 5 MG/1
5 TABLET ORAL DAILY
Qty: 90 TABLET | Refills: 1 | Status: SHIPPED | OUTPATIENT
Start: 2024-07-10

## 2024-10-10 ENCOUNTER — OFFICE VISIT (OUTPATIENT)
Dept: CARDIOLOGY | Facility: CLINIC | Age: 59
End: 2024-10-10
Payer: COMMERCIAL

## 2024-10-10 VITALS
OXYGEN SATURATION: 98 % | HEIGHT: 73 IN | BODY MASS INDEX: 33.93 KG/M2 | HEART RATE: 67 BPM | SYSTOLIC BLOOD PRESSURE: 142 MMHG | WEIGHT: 256 LBS | DIASTOLIC BLOOD PRESSURE: 76 MMHG

## 2024-10-10 DIAGNOSIS — I10 PRIMARY HYPERTENSION: ICD-10-CM

## 2024-10-10 DIAGNOSIS — Z01.810 PRE-OPERATIVE CARDIOVASCULAR EXAMINATION: ICD-10-CM

## 2024-10-10 DIAGNOSIS — G47.33 OSA (OBSTRUCTIVE SLEEP APNEA): Primary | ICD-10-CM

## 2024-10-10 DIAGNOSIS — E78.00 PURE HYPERCHOLESTEROLEMIA: ICD-10-CM

## 2024-10-10 RX ORDER — LISINOPRIL 40 MG/1
40 TABLET ORAL DAILY
Qty: 90 TABLET | Refills: 3 | Status: SHIPPED | OUTPATIENT
Start: 2024-10-10

## 2024-10-10 RX ORDER — METHOCARBAMOL 750 MG/1
1 TABLET, FILM COATED ORAL EVERY 6 HOURS
COMMUNITY
Start: 2024-09-17

## 2024-10-10 RX ORDER — ATORVASTATIN CALCIUM 40 MG/1
40 TABLET, FILM COATED ORAL DAILY
Qty: 90 TABLET | Refills: 1 | Status: SHIPPED | OUTPATIENT
Start: 2024-10-10

## 2024-10-10 RX ORDER — AMLODIPINE BESYLATE 10 MG/1
10 TABLET ORAL DAILY
Qty: 90 TABLET | Refills: 1 | Status: SHIPPED | OUTPATIENT
Start: 2024-10-10

## 2024-10-10 NOTE — ASSESSMENT & PLAN NOTE
He has an unscheduled spinal surgery with Dr. Humphreys.    Discussed cardiac clearance at today's visit.    Noted EKG normal sinus rhythm January 2024.    He has no known history of coronary artery disease.  He has no chest pain and no shortness of breath.    He is able to ambulate up a flight of stairs.  In the past he has exercised at the gym 3 times a day but exercise has been limited due to back pain.    He has coexisting hypertension.  Blood pressure medication adjustment made today with a 2-week follow-up.    He has coexisting hyperlipidemia.  Recent labs showing LDL is very near goal on atorvastatin.  Adjustment made in atorvastatin to 40 mg.    History of obstructive sleep apnea.  And he is advised ELIEL precautions with anesthesia and  sedating medications.

## 2024-10-10 NOTE — ASSESSMENT & PLAN NOTE
He is on atorvastatin 20 mg nightly.    He completed fasting lipid profile       Total cholesterol slightly greater than 200    Plan:    Increase atorvastatin to 40 mg nightly    Recheck lipids and CMP in about 3 months on new dose

## 2024-10-10 NOTE — PROGRESS NOTES
[facility] Provider Note     Date:   10/10/2024  Name: Benedict Landaverde  :   1965  PCP: Chuck Leyva MD    Chief Complaint   Patient presents with    Sleep Apnea    Hypertension       Subjective     History of Present Illness  Benedict Landaverde is a 59 y.o. male who presents today for scheduled 6-month follow-up for his known history of sleep apnea hypertension and hyperlipidemia.    He reports that since our last visit he has had back pain.  He reports that he has been following up with back specialist Dr. Humphreys and he anticipates having back surgery in November.  He does not have a scheduled date at this time.    He reports that in the past he has exercise 3 times a week at the gym but he has not been able to go recently due to back pain.  He reports that he can ambulate up a flight of stairs.  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  He walks thousands of steps per day at his job.  He denies any chest pain or shortness of breath now or in the past.    He has coexisting hyperlipidemia and he completed his labs at his PCPs office this week.  He wishes to review those labs today.      Sleep and cardiac history:    ELIEL baseline AHI 27 on 3/23/2018    Current ELIEL therapy auto CPAP 4 to 20 cm    Hypertension    Hyperlipidemia     Reports Denies   Chest Pain [] [x]   Shortness of Air [] [x]   Palpitations [] [x]   Edema [] [x]   Dizziness [] [x]   Syncope [] [x]       Current mask used is still Pila    Device Functioning Well: Yes  Mask Fit Comfortable: Yes  Air Flow Comfortable: Yes  DME Helpful for Supplies: Yes  Sleep is rested:   No, Pain interrupts sleep and and he reports he is sleeping in the guest room, on the couch and at times in his bed so his sleep is interrupted by pain and this interrupts his CPAP usage.  Hard for him to get comfortable to sleep.    Device Download:               No Known Allergies    Current Outpatient Medications:     amLODIPine  "(NORVASC) 10 MG tablet, Take 1 tablet by mouth Daily., Disp: 90 tablet, Rfl: 1    atorvastatin (LIPITOR) 40 MG tablet, Take 1 tablet by mouth Daily., Disp: 90 tablet, Rfl: 1    lisinopril (PRINIVIL,ZESTRIL) 40 MG tablet, Take 1 tablet by mouth Daily., Disp: 90 tablet, Rfl: 3    methocarbamol (ROBAXIN) 750 MG tablet, Take 1 tablet by mouth Every 6 (Six) Hours., Disp: , Rfl:     Past Medical History:   Diagnosis Date    Abnormal ECG     Arthritis 2023    Unduagnosed but having snehal joint pain in hands    Cervical disc disorder 2005    Claustrophobia     Hyperlipidemia 2020    Hypertension 2000    Low back pain 1980    Lumbosacral disc disease       Past Surgical History:   Procedure Laterality Date    NECK SURGERY  2006    SPINAL FUSION  2007     Family History   Problem Relation Age of Onset    Hyperlipidemia Mother     Arthritis Father     Cancer Father     COPD Father      Social History     Socioeconomic History    Marital status:    Tobacco Use    Smoking status: Never     Passive exposure: Past    Smokeless tobacco: Current     Types: Snuff    Tobacco comments:     I use smokless tobacco refularly   Vaping Use    Vaping status: Never Used   Substance and Sexual Activity    Alcohol use: Yes     Alcohol/week: 10.0 standard drinks of alcohol     Types: 10 Shots of liquor per week    Drug use: Never    Sexual activity: Yes     Partners: Female     Birth control/protection: Surgical       Objective     Vital Signs:  /76   Pulse 67   Ht 185.4 cm (73\")   Wt 116 kg (256 lb)   SpO2 98%   BMI 33.78 kg/m²   Estimated body mass index is 33.78 kg/m² as calculated from the following:    Height as of this encounter: 185.4 cm (73\").    Weight as of this encounter: 116 kg (256 lb).       BMI is >= 30 and <35. (Class 1 Obesity). The following options were offered after discussion;: referral to primary care      Physical Exam  Constitutional:       Appearance: Normal appearance. He is well-developed.   HENT:      " Head: Normocephalic and atraumatic.      Nose: Nose normal.      Mouth/Throat:      Mouth: Mucous membranes are moist.   Eyes:      General: No scleral icterus.     Pupils: Pupils are equal, round, and reactive to light.   Neck:      Vascular: No carotid bruit.   Cardiovascular:      Rate and Rhythm: Normal rate and regular rhythm.      Pulses: Normal pulses.           Radial pulses are 2+ on the right side and 2+ on the left side.        Dorsalis pedis pulses are 2+ on the right side and 2+ on the left side.        Posterior tibial pulses are 2+ on the right side and 2+ on the left side.      Heart sounds: Normal heart sounds. No murmur heard.  Pulmonary:      Effort: Pulmonary effort is normal.      Breath sounds: Normal breath sounds. No wheezing or rhonchi.   Abdominal:      General: Bowel sounds are normal.   Musculoskeletal:      Right lower leg: No edema.      Left lower leg: No edema.   Skin:     General: Skin is warm and dry.      Capillary Refill: Capillary refill takes less than 2 seconds.      Coloration: Skin is not cyanotic.      Nails: There is no clubbing.   Neurological:      Mental Status: He is alert and oriented to person, place, and time.      Motor: No weakness.      Gait: Gait normal.   Psychiatric:         Mood and Affect: Mood normal.         Behavior: Behavior normal. Behavior is cooperative.         Thought Content: Thought content normal.         Cognition and Memory: Memory normal.         The following data was reviewed by: OLIVIER Black on 10/10/2024:    Labs reviewed: 10/7/2024 CBC, CMP, TSH, PSA, lipids and PAP download reviewed: 30-day download as above.  I have reviewed and interpreted the data on the download at today's visit.          Assessment and Plan     Diagnoses and all orders for this visit:    1. ELIEL (obstructive sleep apnea) (Primary)  Assessment & Plan:    AHI is 27.  This is moderate sleep apnea.    He is on CPAP therapy.  Download reviewed with good  control and good compliance.    He is benefiting from PAP therapy and we plan to continue PAP therapy.    ELIEL precautions for anesthesia and sedating medications      2. Primary hypertension  Assessment & Plan:  Blood pressure 142/76.  Borderline blood pressure control.    Reports recent pain and stress may be contributing and not using his PAP device.    He is currently on lisinopril 40 mg and amlodipine 5 mg.    Plan:    Continue lisinopril 40 mg and increase amlodipine from 5 mg to 10 mg.    Advise low-sodium diet    Advised weight loss    Advise blood pressure recheck in 2 weeks.  He may have this completed at our office with a nurse visit or he can complete blood pressure recheck at Dr. Gorman's office with follow-up in 2 weeks.    Orders:  -     amLODIPine (NORVASC) 10 MG tablet; Take 1 tablet by mouth Daily.  Dispense: 90 tablet; Refill: 1  -     lisinopril (PRINIVIL,ZESTRIL) 40 MG tablet; Take 1 tablet by mouth Daily.  Dispense: 90 tablet; Refill: 3    3. Pure hypercholesterolemia  Assessment & Plan:   He is on atorvastatin 20 mg nightly.    He completed fasting lipid profile       Total cholesterol slightly greater than 200    Plan:    Increase atorvastatin to 40 mg nightly    Recheck lipids and CMP in about 3 months on new dose    Orders:  -     atorvastatin (LIPITOR) 40 MG tablet; Take 1 tablet by mouth Daily.  Dispense: 90 tablet; Refill: 1    4. Pre-operative cardiovascular examination  Assessment & Plan:  He has an unscheduled spinal surgery with Dr. Humphreys.    Discussed cardiac clearance at today's visit.    Noted EKG normal sinus rhythm January 2024.    He has no known history of coronary artery disease.  He has no chest pain and no shortness of breath.    He is able to ambulate up a flight of stairs.  In the past he has exercised at the gym 3 times a day but exercise has been limited due to back pain.    He has coexisting hypertension.  Blood pressure medication adjustment made today with a  2-week follow-up.    He has coexisting hyperlipidemia.  Recent labs showing LDL is very near goal on atorvastatin.  Adjustment made in atorvastatin to 40 mg.    History of obstructive sleep apnea.  And he is advised ELIEL precautions with anesthesia and  sedating medications.          Recommendations: Report if any new/changing symptoms immediately, Limitations of preop risk stratification reviewed, and ELIEL precautions for anesthesia          Follow Up  Return in about 3 months (around 1/10/2025) for HTN/HLD/ELIEL.  Patient was given instructions and counseling regarding his condition or for health maintenance advice. Please see specific information pulled into the AVS if appropriate.

## 2024-10-10 NOTE — ASSESSMENT & PLAN NOTE
Blood pressure 142/76.  Borderline blood pressure control.    Reports recent pain and stress may be contributing and not using his PAP device.    He is currently on lisinopril 40 mg and amlodipine 5 mg.    Plan:    Continue lisinopril 40 mg and increase amlodipine from 5 mg to 10 mg.    Advise low-sodium diet    Advised weight loss    Advise blood pressure recheck in 2 weeks.  He may have this completed at our office with a nurse visit or he can complete blood pressure recheck at Dr. Gorman's office with follow-up in 2 weeks.

## 2024-10-10 NOTE — ASSESSMENT & PLAN NOTE
AHI is 27.  This is moderate sleep apnea.    He is on CPAP therapy.  Download reviewed with good control and good compliance.    He is benefiting from PAP therapy and we plan to continue PAP therapy.    ELIEL precautions for anesthesia and sedating medications

## 2024-10-11 ENCOUNTER — PATIENT ROUNDING (BHMG ONLY) (OUTPATIENT)
Dept: CARDIOLOGY | Facility: CLINIC | Age: 59
End: 2024-10-11
Payer: COMMERCIAL

## 2024-10-11 NOTE — PROGRESS NOTES
..My name is Violette Teran and I am the Practice Manager for [facility] [place].    I would like to thank you for being a loyal patient. If you do not mind I would like to ask you a few questions about your recent visit with us.  Please feel free to reply if you wish to provide us with feedback on your first visit with our practice.    First, could you tell me what went well with your recent visit?    Secondly, we are always looking for ways to make our patients' experiences even better.  Do you have any recommendations on ways we may improve?    Finally, overall were you satisfied with your first visit to us as a [facility] facility?    In the next few days, you will be receiving a Patient Experience Survey.      Thank you for taking the time to answer a few questions today.  I hope you have a good day.

## 2024-11-12 ENCOUNTER — TELEPHONE (OUTPATIENT)
Dept: CARDIOLOGY | Facility: CLINIC | Age: 59
End: 2024-11-12
Payer: COMMERCIAL

## 2024-11-12 NOTE — TELEPHONE ENCOUNTER
Last visit in October stated he could have cardiac clearance.  There is also a letter in his chart for Dr. Humphreys for cardiac clearance.  But he was supposed to come back and let me recheck his blood pressure in 2 weeks.  That was about a month ago.  Will you call the patient and see if he got his blood pressure rechecked maybe at Dr. Humphreys office.  If not he needs to come by Thursday and let us check his blood pressure.

## 2024-11-12 NOTE — TELEPHONE ENCOUNTER
DR MARILEE GODFREY IS REQUESTING CARDIAC CLEARANCE FOR A L4-S1 LAMINECTOMY/PLF/TLIF WITH GENERAL ANESTHESIA.  HE WAS LAST SEEN ON 10/11/24.  FORM IS SCANNED INTO CHART.    FAX:  [phone]

## 2024-12-02 ENCOUNTER — OFFICE VISIT (OUTPATIENT)
Dept: CARDIOLOGY | Facility: CLINIC | Age: 59
End: 2024-12-02
Payer: COMMERCIAL

## 2024-12-02 VITALS
SYSTOLIC BLOOD PRESSURE: 134 MMHG | HEART RATE: 84 BPM | BODY MASS INDEX: 34.59 KG/M2 | HEIGHT: 73 IN | WEIGHT: 261 LBS | OXYGEN SATURATION: 97 % | DIASTOLIC BLOOD PRESSURE: 74 MMHG

## 2024-12-02 DIAGNOSIS — I10 PRIMARY HYPERTENSION: Primary | ICD-10-CM

## 2024-12-02 PROCEDURE — 99213 OFFICE O/P EST LOW 20 MIN: CPT | Performed by: NURSE PRACTITIONER

## 2024-12-02 RX ORDER — GABAPENTIN 300 MG/1
1 CAPSULE ORAL 3 TIMES DAILY
COMMUNITY

## 2024-12-02 NOTE — ASSESSMENT & PLAN NOTE
Blood pressure today 134/74.  This is controlled.    He reports he is checking blood pressure at home frequently now.  Blood pressure at home is running 120s over 70s.    He is currently on lisinopril 40 mg and amlodipine 5 mg.    Plan:    Continue current medication regimen    Follow-up in 3 months for blood pressure check

## 2024-12-02 NOTE — PROGRESS NOTES
[facility] Provider Note     Date:   2024   Name: Benedict Landaverde  :   1965  PCP: Chuck Leyva MD    Chief Complaint   Patient presents with    Hypertension       Subjective     History of Present Illness  Benedict Landaverde is a 59 y.o. male who presents today for follow-up on his history of blood pressure.  At his last visit in October his amlodipine was adjusted from 5 to 10 mg.    He reports that he continues to check his blood pressure at home and that his average at home is running about 120/70.  He reports this is much improved on the increased dose of amlodipine.  He denies any chest pain shortness of air, edema, dizziness or syncope.    He reports he has not completed his surgery at this time but is working to get this scheduled.        Sleep and cardiac history:    ELIEL baseline AHI 27 on 3/23/2018    Current ELIEL therapy auto CPAP 4 to 20 cm    Hypertension    Hyperlipidemia     Reports Denies   Chest Pain [] [x]   Shortness of Air [] [x]   Palpitations [] [x]   Edema [] [x]   Dizziness [] [x]   Syncope [] [x]       No Known Allergies    Current Outpatient Medications:     amLODIPine (NORVASC) 10 MG tablet, Take 1 tablet by mouth Daily., Disp: 90 tablet, Rfl: 1    atorvastatin (LIPITOR) 40 MG tablet, Take 1 tablet by mouth Daily., Disp: 90 tablet, Rfl: 1    gabapentin (NEURONTIN) 300 MG capsule, Take 1 capsule by mouth 3 (Three) Times a Day., Disp: , Rfl:     lisinopril (PRINIVIL,ZESTRIL) 40 MG tablet, Take 1 tablet by mouth Daily., Disp: 90 tablet, Rfl: 3    methocarbamol (ROBAXIN) 750 MG tablet, Take 1 tablet by mouth Every 6 (Six) Hours., Disp: , Rfl:     Past Medical History:   Diagnosis Date    Abnormal ECG     Arthritis     Unduagnosed but having snehal joint pain in hands    Cervical disc disorder 2005    Claustrophobia     Hyperlipidemia 2020    Hypertension 2000    Low back pain     Lumbosacral disc disease       Past Surgical History:   Procedure  "Laterality Date    NECK SURGERY  2006    SPINAL FUSION  2007     Family History   Problem Relation Age of Onset    Hyperlipidemia Mother     Arthritis Father     Cancer Father     COPD Father      Social History     Socioeconomic History    Marital status:    Tobacco Use    Smoking status: Never     Passive exposure: Past    Smokeless tobacco: Current     Types: Snuff    Tobacco comments:     I use smokless tobacco refularly   Vaping Use    Vaping status: Never Used   Substance and Sexual Activity    Alcohol use: Yes     Alcohol/week: 10.0 standard drinks of alcohol     Types: 10 Shots of liquor per week    Drug use: Never    Sexual activity: Yes     Partners: Female     Birth control/protection: Surgical       Objective     Vital Signs:  /74 (BP Location: Right arm, Patient Position: Sitting, Cuff Size: Adult)   Pulse 84   Ht 185.4 cm (73\")   Wt 118 kg (261 lb)   SpO2 97%   BMI 34.43 kg/m²   Estimated body mass index is 34.43 kg/m² as calculated from the following:    Height as of this encounter: 185.4 cm (73\").    Weight as of this encounter: 118 kg (261 lb).               Physical Exam  Constitutional:       Appearance: Normal appearance. He is well-developed. He is obese.   HENT:      Head: Normocephalic and atraumatic.      Nose: Nose normal.      Mouth/Throat:      Mouth: Mucous membranes are moist.   Eyes:      General: No scleral icterus.     Pupils: Pupils are equal, round, and reactive to light.   Neck:      Vascular: No carotid bruit.   Cardiovascular:      Rate and Rhythm: Normal rate and regular rhythm.      Pulses: Normal pulses.           Radial pulses are 2+ on the right side and 2+ on the left side.        Dorsalis pedis pulses are 2+ on the right side and 2+ on the left side.        Posterior tibial pulses are 2+ on the right side and 2+ on the left side.      Heart sounds: Normal heart sounds. No murmur heard.  Pulmonary:      Effort: Pulmonary effort is normal.      Breath " sounds: Normal breath sounds. No wheezing or rhonchi.   Abdominal:      General: Bowel sounds are normal.   Musculoskeletal:      Right lower leg: No edema.      Left lower leg: No edema.   Skin:     General: Skin is warm and dry.      Capillary Refill: Capillary refill takes less than 2 seconds.      Coloration: Skin is not cyanotic.      Nails: There is no clubbing.   Neurological:      Mental Status: He is alert and oriented to person, place, and time.      Motor: No weakness.      Gait: Gait normal.   Psychiatric:         Mood and Affect: Mood normal.         Behavior: Behavior normal. Behavior is cooperative.         Thought Content: Thought content normal.         Cognition and Memory: Memory normal.                     Assessment and Plan     Diagnoses and all orders for this visit:    1. Primary hypertension (Primary)  Assessment & Plan:  Blood pressure today 134/74.  This is controlled.    He reports he is checking blood pressure at home frequently now.  Blood pressure at home is running 120s over 70s.    He is currently on lisinopril 40 mg and amlodipine 5 mg.    Plan:    Continue current medication regimen    Follow-up in 3 months for blood pressure check          Recommendations: Report if any new/changing symptoms immediately          Follow Up  Return in about 3 months (around 3/2/2025) for HTN/ELIEL.  Patient was given instructions and counseling regarding his condition or for health maintenance advice. Please see specific information pulled into the AVS if appropriate.

## 2024-12-05 ENCOUNTER — TELEPHONE (OUTPATIENT)
Dept: CARDIOLOGY | Facility: CLINIC | Age: 59
End: 2024-12-05
Payer: COMMERCIAL

## 2024-12-05 NOTE — TELEPHONE ENCOUNTER
DR ROSSI IS REQUESTING CARDIAC CLEARANCE FOR A COLONOSCOPY.  NOT SCHEDULED YET.    PHONE: [phone]  FAX:  [phone]

## 2024-12-05 NOTE — LETTER
Cardiac Risk Assessment Review        Patient Name: Benedict Landaverde  Patient :   1965  Surgical Procedure: Colonoscopy  Date of clearance: 2024  Date of last office visit: 2024   Procedure/Test Performed Date   [] Stress Test None   [] Echocardiogram None   [x] EKG 2024   [] Heart Cath None     Based on the above test results and/or clinical evaluation, it is my recommendation:    [x] Patient has an acceptable cardiac risk for the procedure as planned.      [x] The patient has obstructive sleep apnea and/or central sleep apnea, and appropriate anesthesia precautions should be taken.           If you have any questions, please call our office at [phone].    Thank you,      OLIVIER Black   [facility] [place]   
Type Of Destruction Used: Curettage
Bill 67178 For Specimen Handling/Conveyance To Laboratory?: no
Detail Level: Detailed
Biopsy Type: H and E
Anesthesia Type: 2% lidocaine with epinephrine
Render Post-Care Instructions In Note?: yes
Dressing: bandage
Electrodesiccation And Curettage Text: The wound bed was treated with electrodesiccation and curettage after the biopsy was performed.
Cryotherapy Text: The wound bed was treated with cryotherapy after the biopsy was performed.
Hemostasis: Electrocautery
Silver Nitrate Text: The wound bed was treated with silver nitrate after the biopsy was performed.
Notification Instructions: Patient will be notified of biopsy results. However, patient instructed to call the office if not contacted within 2 weeks.
Post-Care Instructions: I reviewed with the patient in detail post-care instructions. Patient is to keep the biopsy site dry overnight, and then apply bacitracin twice daily until healed. Patient may apply hydrogen peroxide soaks to remove any crusting.
Lab Facility: [id number]
Additional Anesthesia Volume In Cc (Will Not Render If 0): 0
Electrodesiccation Text: The wound bed was treated with electrodesiccation after the biopsy was performed.
Biopsy Method: Personna blade
Anesthesia Volume In Cc (Will Not Render If 0): 0.5
Consent: Risks were reviewed including but not limited to scarring, infection, bleeding, scabbing, incomplete removal, nerve damage and allergy to anesthesia.
Lab: [id number]
Wound Care: Bacitracin
Billing Type: Third-Party Bill

## 2024-12-23 ENCOUNTER — TELEPHONE (OUTPATIENT)
Dept: CARDIOLOGY | Facility: CLINIC | Age: 59
End: 2024-12-23
Payer: COMMERCIAL

## 2024-12-23 NOTE — TELEPHONE ENCOUNTER
Patient had back surgery last week and is now having feet and leg surgery, he is not sure if it is the amlodipine but he thinks it is. Should he go back to lower dose? The 10mg has managed his BP he stated. Please advise.

## 2024-12-23 NOTE — TELEPHONE ENCOUNTER
PT called and LVM regarding swelling in feet and legs following back surgery.  PT is asking for a call back as soon as possible to discuss.

## 2025-01-30 ENCOUNTER — TELEPHONE (OUTPATIENT)
Dept: CARDIOLOGY | Facility: CLINIC | Age: 60
End: 2025-01-30

## 2025-01-30 DIAGNOSIS — G47.33 OSA (OBSTRUCTIVE SLEEP APNEA): Primary | ICD-10-CM

## 2025-01-30 NOTE — TELEPHONE ENCOUNTER
"Caller: Benedict Landaverde \"Buster\"    Relationship: Self    Best call back number: [phone]     What is the best time to reach you: ANY     Who are you requesting to speak with (clinical staff, provider,  specific staff member): CLINICAL     What was the call regarding: WIFE TOOK CPAP TO [facility] DUE TO MAKING A NOISE, THEY ARE SAYING THIS NEEDS A FEW PARTS REPLACED. WAS ASKED TO CALL US FOR AN RX SO THAT INSURANCE CAN COVER THIS. PLEASE ADVISE.     Is it okay if the provider responds through [email or web address]: CALL   "

## 2025-01-30 NOTE — TELEPHONE ENCOUNTER
Please call patient wife let him know I put in a new order to [facility].  Send order to [facility].  Also make a phone call to [facility] and see if they can replace it is soon as possible.

## 2025-03-13 ENCOUNTER — OFFICE VISIT (OUTPATIENT)
Dept: CARDIOLOGY | Facility: CLINIC | Age: 60
End: 2025-03-13
Payer: COMMERCIAL

## 2025-03-13 VITALS
HEIGHT: 73 IN | DIASTOLIC BLOOD PRESSURE: 66 MMHG | WEIGHT: 262 LBS | BODY MASS INDEX: 34.72 KG/M2 | HEART RATE: 76 BPM | SYSTOLIC BLOOD PRESSURE: 124 MMHG | OXYGEN SATURATION: 98 %

## 2025-03-13 DIAGNOSIS — I10 PRIMARY HYPERTENSION: ICD-10-CM

## 2025-03-13 DIAGNOSIS — E78.00 PURE HYPERCHOLESTEROLEMIA: ICD-10-CM

## 2025-03-13 DIAGNOSIS — G47.33 OSA (OBSTRUCTIVE SLEEP APNEA): Primary | ICD-10-CM

## 2025-03-13 PROCEDURE — 93000 ELECTROCARDIOGRAM COMPLETE: CPT | Performed by: NURSE PRACTITIONER

## 2025-03-13 PROCEDURE — 99214 OFFICE O/P EST MOD 30 MIN: CPT | Performed by: NURSE PRACTITIONER

## 2025-03-13 RX ORDER — LISINOPRIL 40 MG/1
40 TABLET ORAL DAILY
Qty: 90 TABLET | Refills: 3 | Status: SHIPPED | OUTPATIENT
Start: 2025-03-13

## 2025-03-13 RX ORDER — ATORVASTATIN CALCIUM 40 MG/1
40 TABLET, FILM COATED ORAL DAILY
Qty: 90 TABLET | Refills: 1 | Status: SHIPPED | OUTPATIENT
Start: 2025-03-13

## 2025-03-13 RX ORDER — AMLODIPINE BESYLATE 10 MG/1
10 TABLET ORAL DAILY
Qty: 90 TABLET | Refills: 1 | Status: SHIPPED | OUTPATIENT
Start: 2025-03-13

## 2025-03-13 NOTE — PATIENT INSTRUCTIONS
Mediterranean Diet  A Mediterranean diet refers to food and lifestyle choices that are based on the traditions of countries located on the [place] Sea. It focuses on eating more fruits, vegetables, whole grains, beans, nuts, seeds, and heart-healthy fats, and eating less dairy, meat, eggs, and processed foods with added sugar, salt, and fat. This way of eating has been shown to help prevent certain conditions and improve outcomes for people who have chronic diseases, like kidney disease and heart disease.  What are tips for following this plan?  Reading food labels  Check the serving size of packaged foods. For foods such as rice and pasta, the serving size refers to the amount of cooked product, not dry.  Check the total fat in packaged foods. Avoid foods that have saturated fat or trans fats.  Check the ingredient list for added sugars, such as corn syrup.  Shopping    Buy a variety of foods that offer a balanced diet, including:  Fresh fruits and vegetables (produce).  Grains, beans, nuts, and seeds. Some of these may be available in unpackaged forms or large amounts (in bulk).  Fresh seafood.  Poultry and eggs.  Low-fat dairy products.  Buy whole ingredients instead of prepackaged foods.  Buy fresh fruits and vegetables in-season from local [place] markets.  Buy plain frozen fruits and vegetables.  If you do not have access to quality fresh seafood, buy precooked frozen shrimp or canned fish, such as tuna, salmon, or sardines.  Stock your pantry so you always have certain foods on hand, such as olive oil, canned tuna, canned tomatoes, rice, pasta, and beans.  Cooking  Cook foods with extra-virgin olive oil instead of using butter or other vegetable oils.  Have meat as a side dish, and have vegetables or grains as your main dish. This means having meat in small portions or adding small amounts of meat to foods like pasta or stew.  Use beans or vegetables instead of meat in common dishes like chili or  lasagna.  [place] with different cooking methods. Try roasting, broiling, steaming, and sautéing vegetables.  Add frozen vegetables to soups, stews, pasta, or rice.  Add nuts or seeds for added healthy fats and plant protein at each meal. You can add these to yogurt, salads, or vegetable dishes.  Marinate fish or vegetables using olive oil, lemon juice, garlic, and fresh herbs.  Meal planning  Plan to eat one vegetarian meal one day each week. Try to work up to two vegetarian meals, if possible.  Eat seafood two or more times a week.  Have healthy snacks readily available, such as:  Vegetable sticks with hummus.  Greek yogurt.  Fruit and nut trail mix.  Eat balanced meals throughout the week. This includes:  Fruit: 2-3 servings a day.  Vegetables: 4-5 servings a day.  Low-fat dairy: 2 servings a day.  Fish, poultry, or lean meat: 1 serving a day.  Beans and legumes: 2 or more servings a week.  Nuts and seeds: 1-2 servings a day.  Whole grains: 6-8 servings a day.  Extra-virgin olive oil: 3-4 servings a day.  Limit red meat and sweets to only a few servings a month.  Lifestyle    Cook and eat meals together with your family, when possible.  Drink enough fluid to keep your urine pale yellow.  Be physically active every day. This includes:  Aerobic exercise like running or swimming.  Leisure activities like gardening, walking, or housework.  Get 7-8 hours of sleep each night.  If recommended by your health care provider, drink red wine in moderation. This means 1 glass a day for nonpregnant women and 2 glasses a day for men. A glass of wine equals 5 oz (150 mL).  What foods should I eat?  Fruits  Apples. Apricots. Avocado. Berries. Bananas. Cherries. Dates. Figs. Grapes. [place]. Melon. Oranges. Peaches. Plums. Pomegranate.  Vegetables  Artichokes. Beets. Broccoli. Cabbage. Carrots. Eggplant. Green beans. Chard. Kale. Spinach. Onions. Leeks. Peas. Squash. Tomatoes. Peppers. Radishes.  Grains  Whole-grain pasta. Brown  rice. Bulgur wheat. Polenta. Couscous. Whole-wheat bread. Oatmeal. Quinoa.  Meats and other proteins  Beans. Almonds. Sunflower seeds. Pine nuts. Peanuts. Cod. [place]. Scallops. Shrimp. Tuna. Tilapia. Clams. Oysters. Eggs. Poultry without skin.  Dairy  Low-fat milk. Cheese. Greek yogurt.  Fats and oils  Extra-virgin olive oil. Avocado oil. Grapeseed oil.  Beverages  Water. Red wine. Herbal tea.  Sweets and desserts  Greek yogurt with honey. Baked apples. Poached pears. Trail mix.  Seasonings and condiments  Basil. Cilantro. Coriander. Cumin. Mint. Parsley. Sumit. Rosemary. Tarragon. Garlic. Oregano. Thyme. Pepper. Balsamic vinegar. Tahini. Hummus. Tomato sauce. Olives. Mushrooms.  The items listed above may not be a complete list of foods and beverages you can eat. Contact a dietitian for more information.  What foods should I limit?  This is a list of foods that should be eaten rarely or only on special occasions.  Fruits  Fruit canned in syrup.  Vegetables  Deep-fried potatoes (french fries).  Grains  Prepackaged pasta or rice dishes. Prepackaged cereal with added sugar. Prepackaged snacks with added sugar.  Meats and other proteins  Beef. Pork. Lamb. Poultry with skin. Hot dogs. [place].  Dairy  Ice cream. Sour cream. Whole milk.  Fats and oils  Butter. Canola oil. Vegetable oil. Beef fat (tallow). Lard.  Beverages  Juice. Sugar-sweetened soft drinks. Beer. Liquor and spirits.  Sweets and desserts  Cookies. Cakes. Pies. Candy.  Seasonings and condiments  Mayonnaise. Pre-made sauces and marinades.  The items listed above may not be a complete list of foods and beverages you should limit. Contact a dietitian for more information.  Summary  The Mediterranean diet includes both food and lifestyle choices.  Eat a variety of fresh fruits and vegetables, beans, nuts, seeds, and whole grains.  Limit the amount of red meat and sweets that you eat.  If recommended by your health care provider, drink red wine in moderation.  This means 1 glass a day for nonpregnant women and 2 glasses a day for men. A glass of wine equals 5 oz (150 mL).  This information is not intended to replace advice given to you by your health care provider. Make sure you discuss any questions you have with your health care provider.  Document Revised: 01/22/2021 Document Reviewed: 11/19/2020  Elsevier Patient Education © 2022 Elsevier Inc.

## 2025-03-13 NOTE — ASSESSMENT & PLAN NOTE
Known history of sleep apnea.  Baseline AHI is 27 and this is moderate sleep apnea.    He has a new CPAP device that he has had more than 1 month but less than 3 months.    Download is reviewed with good control and good compliance.    He reports that he is satisfied with his new device.  Unfortunately he traveled and forgot to take it so his usage is decreased a little bit.    He is benefiting from PAP therapy.    We plan to continue PAP therapy.    He is advised to increase use of PAP to all hours of sleep.    We will have him follow-up in about 4 to 6 weeks to review the download with increased compliance.

## 2025-03-13 NOTE — ASSESSMENT & PLAN NOTE
Blood pressure today 124/66.  He reports checking at home and he is seeing systolic blood pressures in the 120s.    He is taking amlodipine 10 mg and lisinopril 40 mg.    Continue current blood pressure medication regimen.

## 2025-03-13 NOTE — PROGRESS NOTES
[facility] Provider Note     Date:   2025  Name: Benedict Landaverde  :   1965  PCP: Chuck Leyva MD    Chief Complaint   Patient presents with    Sleep Apnea     Pt here for follow up on ELIEL/HTN.         Subjective     History of Present Illness  Benedict Landaverde is a 59 y.o. male who presents today for scheduled 3-month follow-up on his known history of hypertension hyperlipidemia and sleep apnea.  He reports he got a new PAP machine 1 month ago.    Since his last visit here he has had a back surgery.  He reports this has improved his pain.  He reports that he has been okayed to restart exercise but he cannot do any weight lifting.    Overall he reports that he is feeling better.  He reports he also got his new PAP machine about a month ago.  Unfortunately he did travel to [facility] for business and he forgot to take it so he did miss some time on it.    Sleep and cardiac history:    ELIEL baseline AHI 27 on 3/23/2018    Current ELIEL therapy auto CPAP 4 to 20 cm    Hypertension    Hyperlipidemia     Reports Denies   Chest Pain [] [x]   Shortness of Air [] [x]   Palpitations [] [x]   Edema [] [x]   Dizziness [] [x]   Syncope [] [x]     Current mask used is nasal pillow    Device Functioning Well: Yes-feels like the new machine is stronger  Mask Fit Comfortable: Yes  Air Flow Comfortable: Yes  DME Helpful for Supplies: Yes  Sleep is rested:   Yes    Device Download:               No Known Allergies    Current Outpatient Medications:     amLODIPine (NORVASC) 10 MG tablet, Take 1 tablet by mouth Daily., Disp: 90 tablet, Rfl: 1    atorvastatin (LIPITOR) 40 MG tablet, Take 1 tablet by mouth Daily., Disp: 90 tablet, Rfl: 1    lisinopril (PRINIVIL,ZESTRIL) 40 MG tablet, Take 1 tablet by mouth Daily., Disp: 90 tablet, Rfl: 3    Past Medical History:   Diagnosis Date    Abnormal ECG     Arthritis     Unduagnosed but having snehal joint pain in hands    Cervical disc disorder      "Claustrophobia     Hyperlipidemia 2020    Hypertension 2000    Low back pain 1980    Lumbosacral disc disease     Sleep apnea     baseline AHI 27      Past Surgical History:   Procedure Laterality Date    BACK SURGERY N/A 12/18/2024    NECK SURGERY  2006    SPINAL FUSION  2007     Family History   Problem Relation Age of Onset    Hyperlipidemia Mother     Arthritis Father     Cancer Father     COPD Father      Social History     Socioeconomic History    Marital status:     Number of children: 2   Tobacco Use    Smoking status: Never     Passive exposure: Past    Smokeless tobacco: Current     Types: Snuff    Tobacco comments:     I use smokless tobacco refularly   Vaping Use    Vaping status: Never Used   Substance and Sexual Activity    Alcohol use: Yes     Alcohol/week: 10.0 standard drinks of alcohol     Types: 10 Shots of liquor per week    Drug use: Never    Sexual activity: Yes     Partners: Female     Birth control/protection: Surgical       Objective     Vital Signs:  /66 (BP Location: Right arm, Patient Position: Sitting, Cuff Size: Adult)   Pulse 76   Ht 185.4 cm (73\")   Wt 119 kg (262 lb)   SpO2 98%   BMI 34.57 kg/m²   Estimated body mass index is 34.57 kg/m² as calculated from the following:    Height as of this encounter: 185.4 cm (73\").    Weight as of this encounter: 119 kg (262 lb).               Physical Exam  Constitutional:       Appearance: Normal appearance. He is well-developed.   HENT:      Head: Normocephalic and atraumatic.      Nose: Nose normal.      Mouth/Throat:      Mouth: Mucous membranes are moist.   Eyes:      General: No scleral icterus.     Pupils: Pupils are equal, round, and reactive to light.   Neck:      Vascular: No carotid bruit.   Cardiovascular:      Rate and Rhythm: Normal rate and regular rhythm.      Pulses: Normal pulses.           Radial pulses are 2+ on the right side and 2+ on the left side.        Dorsalis pedis pulses are 2+ on the right side and " 2+ on the left side.        Posterior tibial pulses are 2+ on the right side and 2+ on the left side.      Heart sounds: Normal heart sounds. No murmur heard.  Pulmonary:      Effort: Pulmonary effort is normal.      Breath sounds: Normal breath sounds. No wheezing or rhonchi.   Abdominal:      General: Bowel sounds are normal.   Musculoskeletal:      Right lower leg: No edema.      Left lower leg: No edema.   Skin:     General: Skin is warm and dry.      Capillary Refill: Capillary refill takes less than 2 seconds.      Coloration: Skin is not cyanotic.      Nails: There is no clubbing.   Neurological:      Mental Status: He is alert and oriented to person, place, and time.      Motor: No weakness.      Gait: Gait normal.   Psychiatric:         Mood and Affect: Mood normal.         Behavior: Behavior normal. Behavior is cooperative.         Thought Content: Thought content normal.         Cognition and Memory: Memory normal.         The following data was reviewed by: OLIIVER Black on 03/13/2025:    PAP download reviewed: 30-day download as above.  I have reviewed and interpreted the data on the download at today's visit.      ECG 12 Lead    Date/Time: 3/13/2025 1:34 PM  Performed by: Ebonie Herring APRN    Authorized by: Ebonie Herring APRN  Comparison: compared with previous ECG from 1/4/2024  Similar to previous ECG  Comparison to previous ECG: Sinus rhythm  Rhythm: sinus rhythm  Rate: normal  BPM: 61  Conduction: conduction normal  ST Segments: ST segments normal  T Waves: T waves normal  QRS axis: normal  Other: no other findings    Clinical impression: normal ECG           Assessment and Plan     Diagnoses and all orders for this visit:    1. ELIEL (obstructive sleep apnea) (Primary)  Assessment & Plan:  Known history of sleep apnea.  Baseline AHI is 27 and this is moderate sleep apnea.    He has a new CPAP device that he has had more than 1 month but less than 3 months.    Download is  reviewed with good control and good compliance.    He reports that he is satisfied with his new device.  Unfortunately he traveled and forgot to take it so his usage is decreased a little bit.    He is benefiting from PAP therapy.    We plan to continue PAP therapy.    He is advised to increase use of PAP to all hours of sleep.    We will have him follow-up in about 4 to 6 weeks to review the download with increased compliance.      2. Primary hypertension  Assessment & Plan:  Blood pressure today 124/66.  He reports checking at home and he is seeing systolic blood pressures in the 120s.    He is taking amlodipine 10 mg and lisinopril 40 mg.    Continue current blood pressure medication regimen.    Orders:  -     amLODIPine (NORVASC) 10 MG tablet; Take 1 tablet by mouth Daily.  Dispense: 90 tablet; Refill: 1  -     lisinopril (PRINIVIL,ZESTRIL) 40 MG tablet; Take 1 tablet by mouth Daily.  Dispense: 90 tablet; Refill: 3  -     CBC & Differential; Future  -     Comprehensive Metabolic Panel; Future  -     ECG 12 Lead    3. Pure hypercholesterolemia  Assessment & Plan:   He is now on atorvastatin 40 mg nightly.    He reports he is tolerating this dose.    Last lipids showing his total cholesterol slightly greater than 200 and his LDL was 113.    Plan:    Continue atorvastatin 40 mg nightly    Recheck his CMP and lipid profile fasting        Orders:  -     atorvastatin (LIPITOR) 40 MG tablet; Take 1 tablet by mouth Daily.  Dispense: 90 tablet; Refill: 1  -     CBC & Differential; Future  -     Comprehensive Metabolic Panel; Future  -     Lipid Panel; Future        Recommendations: Report if any new/changing symptoms immediately and Increase pap therapy usage          Follow Up  Return in about 6 weeks (around 4/24/2025) for ELIEL/31-90 days and lab results .  Patient was given instructions and counseling regarding his condition or for health maintenance advice. Please see specific information pulled into the AVS if  appropriate.

## 2025-03-13 NOTE — ASSESSMENT & PLAN NOTE
He is now on atorvastatin 40 mg nightly.    He reports he is tolerating this dose.    Last lipids showing his total cholesterol slightly greater than 200 and his LDL was 113.    Plan:    Continue atorvastatin 40 mg nightly    Recheck his CMP and lipid profile fasting

## 2025-03-20 ENCOUNTER — PATIENT ROUNDING (BHMG ONLY) (OUTPATIENT)
Dept: CARDIOLOGY | Facility: CLINIC | Age: 60
End: 2025-03-20
Payer: COMMERCIAL

## 2025-03-26 DIAGNOSIS — I10 PRIMARY HYPERTENSION: ICD-10-CM

## 2025-03-26 RX ORDER — AMLODIPINE BESYLATE 5 MG/1
5 TABLET ORAL DAILY
Qty: 90 TABLET | Refills: 1 | OUTPATIENT
Start: 2025-03-26

## 2025-04-15 ENCOUNTER — TELEPHONE (OUTPATIENT)
Dept: CARDIOLOGY | Facility: CLINIC | Age: 60
End: 2025-04-15

## 2025-04-24 ENCOUNTER — OFFICE VISIT (OUTPATIENT)
Dept: CARDIOLOGY | Facility: CLINIC | Age: 60
End: 2025-04-24
Payer: COMMERCIAL

## 2025-04-24 VITALS
OXYGEN SATURATION: 100 % | SYSTOLIC BLOOD PRESSURE: 137 MMHG | BODY MASS INDEX: 34.59 KG/M2 | HEIGHT: 73 IN | DIASTOLIC BLOOD PRESSURE: 80 MMHG | HEART RATE: 66 BPM | WEIGHT: 261 LBS

## 2025-04-24 DIAGNOSIS — I10 PRIMARY HYPERTENSION: ICD-10-CM

## 2025-04-24 DIAGNOSIS — E78.00 PURE HYPERCHOLESTEROLEMIA: ICD-10-CM

## 2025-04-24 DIAGNOSIS — G47.33 OSA (OBSTRUCTIVE SLEEP APNEA): Primary | ICD-10-CM

## 2025-04-24 PROCEDURE — 99214 OFFICE O/P EST MOD 30 MIN: CPT | Performed by: NURSE PRACTITIONER

## 2025-04-24 NOTE — ASSESSMENT & PLAN NOTE
Baseline AHI is 27.  He has history of moderate sleep apnea.    He is on CPAP therapy.  He has been on his new CPAP more than 1 month but less than 3 months.    Download is reviewed with good control but noted mildly elevated AHI at 5.6.  Good compliance.    He is benefiting from PAP therapy.    Plan to continue PAP therapy.    Prescription to DME of patient's choice for a slight pressure adjustment to include 8 to 14 cm.  Leave the ramp the same.  And PAP supplies.    Follow-up in 6 months for hypertension hyperlipidemia and sleep apnea.

## 2025-04-24 NOTE — PROGRESS NOTES
[facility] Provider Note     Date:   2025  Name: Benedict Landaverde  :   1965  PCP: Chuck Leyva MD    Chief Complaint   Patient presents with    Sleep Apnea     Pt states he is here today for 31-90 day follow up ELIEL. He states his current therapy is working well. DL is in Epic.        Subjective     History of Present Illness  Benedict Landaverde is a 59 y.o. male who presents today for  office visit on new CPAP.    He reports he has had the machine more than 1 month but less than 3 months.    He reports he is satisfied with his new device.    He has also completed his lab work.  He wishes to review the lab results at today's visit.  He has coexisting hyperlipidemia and hypertension.  Reports he is compliant with his medication.    He also reports that he is now able to walk and exercise after his back surgery.  He reports he is walking 1 mile a day and is hopeful that he may increase this and he is working on his weight loss program.  Reports he would like to lose about 30 pounds.    Sleep and cardiac history:    ELIEL baseline AHI 27 on 3/23/2018    Current ELIEL therapy auto CPAP 4 to 20 cm    Hypertension    Hyperlipidemia     Reports Denies   Chest Pain [] [x]   Shortness of Air [] [x]   Palpitations [] [x]   Edema [] [x]   Dizziness [] [x]   Syncope [] [x]     Current mask used is fullface mask    Device Functioning Well: Yes  Mask Fit Comfortable: Yes  Air Flow Comfortable: Yes  DME Helpful for Supplies: Yes  Sleep is rested:   Yes    Device Download:               No Known Allergies    Current Outpatient Medications:     amLODIPine (NORVASC) 10 MG tablet, Take 1 tablet by mouth Daily., Disp: 90 tablet, Rfl: 1    atorvastatin (LIPITOR) 40 MG tablet, Take 1 tablet by mouth Daily., Disp: 90 tablet, Rfl: 1    lisinopril (PRINIVIL,ZESTRIL) 40 MG tablet, Take 1 tablet by mouth Daily., Disp: 90 tablet, Rfl: 3    Past Medical History:   Diagnosis Date    Abnormal ECG     Arthritis   "   Unduagnosed but having snehal joint pain in hands    Cervical disc disorder 2005    Claustrophobia     Hyperlipidemia 2020    Hypertension 2000    Low back pain 1980    Lumbosacral disc disease     Sleep apnea     baseline AHI 27      Past Surgical History:   Procedure Laterality Date    BACK SURGERY N/A 12/18/2024    NECK SURGERY  2006    SPINAL FUSION  2007     Family History   Problem Relation Age of Onset    Hyperlipidemia Mother     Arthritis Father     Cancer Father     COPD Father      Social History     Socioeconomic History    Marital status:     Number of children: 2   Tobacco Use    Smoking status: Never     Passive exposure: Past    Smokeless tobacco: Current     Types: Snuff    Tobacco comments:     I use smokless tobacco refularly   Vaping Use    Vaping status: Never Used   Substance and Sexual Activity    Alcohol use: Yes     Alcohol/week: 10.0 standard drinks of alcohol     Types: 10 Shots of liquor per week    Drug use: Never    Sexual activity: Yes     Partners: Female     Birth control/protection: Surgical       Objective     Vital Signs:  /80 (BP Location: Right arm, Patient Position: Sitting, Cuff Size: Adult)   Pulse 66   Ht 185.4 cm (73\")   Wt 118 kg (261 lb)   SpO2 100%   BMI 34.43 kg/m²   Estimated body mass index is 34.43 kg/m² as calculated from the following:    Height as of this encounter: 185.4 cm (73\").    Weight as of this encounter: 118 kg (261 lb).               Physical Exam  HENT:      Head: Normocephalic.      Nose: Nose normal.      Mouth/Throat:      Mouth: Mucous membranes are moist.   Pulmonary:      Effort: Pulmonary effort is normal.   Skin:     General: Skin is warm and dry.   Neurological:      Mental Status: He is alert and oriented to person, place, and time.   Psychiatric:         Mood and Affect: Mood normal.         Behavior: Behavior normal.         Thought Content: Thought content normal.         The following data was reviewed by: Ebonie RIZZO" OLIVIER Herring on 04/24/2025:    Labs reviewed: CBC CMP and fasting lipid profile and PAP download reviewed: 30-day download as above.  I have reviewed and interpreted the data on the download at today's visit          Assessment and Plan     Diagnoses and all orders for this visit:    1. ELIEL (obstructive sleep apnea) (Primary)  Assessment & Plan:  Baseline AHI is 27.  He has history of moderate sleep apnea.    He is on CPAP therapy.  He has been on his new CPAP more than 1 month but less than 3 months.    Download is reviewed with good control but noted mildly elevated AHI at 5.6.  Good compliance.    He is benefiting from PAP therapy.    Plan to continue PAP therapy.    Prescription to DME of patient's choice for a slight pressure adjustment to include 8 to 14 cm.  Leave the ramp the same.  And PAP supplies.    Follow-up in 6 months for hypertension hyperlipidemia and sleep apnea.    Orders:  -     PAP Therapy    2. Pure hypercholesterolemia  Assessment & Plan:   Lab work was reviewed showing his total cholesterol is near 200.  His .    He is on atorvastatin 40 mg nightly.    He reports that he is tolerating this dose.    We discussed increasing his statin or adding a Zetia or lifestyle modifications.    Patient reports that he would like to try lifestyle modifications.  We discussed increasing exercise, increasing fiber and decreasing cholesterol from animal sources.  Patient verbalized understanding    We will continue to follow cholesterol annually and at this time will continue atorvastatin 40 mg nightly.          Recommendations: Report if any new/changing symptoms immediately, Exercise recommendations discussed, and lifestyle modifications to lower cholesterol such as exercise, increasing fiber in diet and lowering intake of animal cholesterol products.          Follow Up  Return in about 6 months (around 10/24/2025) for ELIEL/HTN/HLD.  Patient was given instructions and counseling regarding his  condition or for health maintenance advice. Please see specific information pulled into the AVS if appropriate.